# Patient Record
Sex: MALE | URBAN - METROPOLITAN AREA
[De-identification: names, ages, dates, MRNs, and addresses within clinical notes are randomized per-mention and may not be internally consistent; named-entity substitution may affect disease eponyms.]

---

## 2017-04-25 ENCOUNTER — TRANSFERRED RECORDS (OUTPATIENT)
Dept: HEALTH INFORMATION MANAGEMENT | Facility: CLINIC | Age: 57
End: 2017-04-25

## 2017-05-05 ENCOUNTER — TRANSFERRED RECORDS (OUTPATIENT)
Dept: HEALTH INFORMATION MANAGEMENT | Facility: CLINIC | Age: 57
End: 2017-05-05

## 2017-10-27 ENCOUNTER — TRANSFERRED RECORDS (OUTPATIENT)
Dept: HEALTH INFORMATION MANAGEMENT | Facility: CLINIC | Age: 57
End: 2017-10-27

## 2017-11-22 NOTE — TELEPHONE ENCOUNTER
APPT INFO    Date /Time:  12/6/17 10AM    Reason for Appt: W/C L shoulder arthritis / tendonitis    Ref Provider/Clinic:  JEYSON MILLER/ cait    Are there internal records? If yes, list: none   Patient Contact (Y/N) & Call Details: No- referral   Action: Reviewed records     OUTSIDE RECORDS CHECKLIST     CLINIC NAME COMMENTS REC (x) IMG (x)   [facility]              Records Received From: [facility]    Date/Exam/Location  (specify location if different)   Office Notes:  8/24/17, 8/23/16, 5/11/17, 9/26/17, 10/27/17, 10/17/16   Radiology Reports: - xray left shoulder 7/26/16   - xray arthrogram left shoulder 8/8/16  - CT ext upper left 8/8/16  - xray thoracic 4/25/17  - xray cervical 12/22/16  - xray myelogram cervical 5/5/17  - CT cervical 5/5/17  Sarah Cunha Notified (Y/N):    Procedure Notes:  (include injections) - EMG 10/17/16   Missing: - surgeries

## 2017-11-29 NOTE — TELEPHONE ENCOUNTER
Received Imaging From: [facility]     Image Type (x): Disc:_____  Pacs:_x____      Exam Date/Name: All images listed in [place] records Comments: images are in PACS

## 2017-12-04 DIAGNOSIS — G89.29 CHRONIC LEFT SHOULDER PAIN: Primary | ICD-10-CM

## 2017-12-04 DIAGNOSIS — M25.512 CHRONIC LEFT SHOULDER PAIN: Primary | ICD-10-CM

## 2017-12-06 ENCOUNTER — PRE VISIT (OUTPATIENT)
Dept: ORTHOPEDICS | Facility: CLINIC | Age: 57
End: 2017-12-06

## 2017-12-06 NOTE — TELEPHONE ENCOUNTER
Records Received From: [place]     Date/Exam/Location  (specify location if different)   Radiology Reports: - xray left shoulder 7/26/16  - xray arthrogram left shoulder 8/8/16  - CT upper ext 8/8/16  Sarah Cunha Notified (Y/N):    Procedure Notes:  (include injections) - left shoulder injection 9/21/16

## 2017-12-27 ENCOUNTER — OFFICE VISIT (OUTPATIENT)
Dept: ORTHOPEDICS | Facility: CLINIC | Age: 57
End: 2017-12-27
Payer: OTHER MISCELLANEOUS

## 2017-12-27 ENCOUNTER — RADIANT APPOINTMENT (OUTPATIENT)
Dept: GENERAL RADIOLOGY | Facility: CLINIC | Age: 57
End: 2017-12-27
Attending: ORTHOPAEDIC SURGERY
Payer: OTHER MISCELLANEOUS

## 2017-12-27 VITALS — HEIGHT: 74 IN | BODY MASS INDEX: 29.54 KG/M2 | WEIGHT: 230.2 LBS

## 2017-12-27 DIAGNOSIS — G62.9 PERIPHERAL POLYNEUROPATHY: Primary | ICD-10-CM

## 2017-12-27 DIAGNOSIS — M25.512 CHRONIC LEFT SHOULDER PAIN: ICD-10-CM

## 2017-12-27 DIAGNOSIS — G89.29 CHRONIC LEFT SHOULDER PAIN: ICD-10-CM

## 2017-12-27 RX ORDER — SENNA AND DOCUSATE SODIUM 50; 8.6 MG/1; MG/1
TABLET, FILM COATED ORAL
COMMUNITY
Start: 2017-09-19

## 2017-12-27 RX ORDER — ESCITALOPRAM OXALATE 20 MG/1
20 TABLET ORAL
COMMUNITY
Start: 2017-09-05

## 2017-12-27 RX ORDER — PREGABALIN 300 MG/1
CAPSULE ORAL
COMMUNITY
Start: 2017-09-07

## 2017-12-27 RX ORDER — BUPROPION HYDROCHLORIDE 450 MG/1
450 TABLET, FILM COATED, EXTENDED RELEASE ORAL
COMMUNITY
Start: 2017-08-01

## 2017-12-27 RX ORDER — MODAFINIL 100 MG/1
100 TABLET ORAL
COMMUNITY
Start: 2017-09-05 | End: 2018-03-04

## 2017-12-27 RX ORDER — LEVOTHYROXINE SODIUM 125 UG/1
125 TABLET ORAL
COMMUNITY
Start: 2017-04-20

## 2017-12-27 RX ORDER — CHLORHEXIDINE GLUCONATE ORAL RINSE 1.2 MG/ML
15 SOLUTION DENTAL
COMMUNITY

## 2017-12-27 RX ORDER — FLUORIDE TOOTHPASTE
TOOTHPASTE DENTAL
COMMUNITY
Start: 2017-09-19

## 2017-12-27 RX ORDER — TRIAMCINOLONE ACETONIDE 1 MG/G
OINTMENT TOPICAL
COMMUNITY
Start: 2017-06-01

## 2017-12-27 NOTE — NURSING NOTE
"Reason For Visit:   Chief Complaint   Patient presents with     Consult     W/C Left shoulder arthritis/tendonitis.       PCP: No primary care provider on file.  Ref: JEYSON MILLER    ?  No  Occupation none.  Currently working? No.  Work status?  On disability.  Date of injury: 2000 on right, 2011 left  Type of injury: W/C both time felt shoulder give out while working  Date of surgery: 2000 on right   Type of surgery: Many surgeries.  Smoker: No  Request smoking cessation information: No    Right hand dominant    SANE score  Affected shoulder: Left  Right shoulder SANE: 0  Left shoulder SANE: 0    Ht 1.869 m (6' 1.58\")  Wt 104.4 kg (230 lb 3.2 oz)  BMI 29.89 kg/m2      Pain Assessment  Patient Currently in Pain: Yes  0-10 Pain Scale: 9  Primary Pain Location: Shoulder  Pain Orientation: Left  Pain Descriptors: Aching, Burning, Stabbing  Alleviating Factors: Rest  Aggravating Factors: Movement (vibration)    Mirella Rogers ATC        "

## 2017-12-27 NOTE — MR AVS SNAPSHOT
After Visit Summary   2017    Manny Frazier    MRN: [id number]           Patient Information     Date Of Birth          1960        Visit Information        Provider Department      2017 11:00 AM Suki Vieira MD [facility]        Today's Diagnoses     Peripheral polyneuropathy    -  1       Follow-ups after your visit        Follow-up notes from your care team     Return if symptoms worsen or fail to improve.      Who to contact     Please call your clinic at [phone] to:    Ask questions about your health    Make or cancel appointments    Discuss your medicines    Learn about your test results    Speak to your doctor   If you have compliments or concerns about an experience at your clinic, or if you wish to file a complaint, please contact [facility] Patient Relations at [phone] or email us at [email or web address]         Additional Information About Your Visit        MyChart Information     [employer] is an electronic gateway that provides easy, online access to your medical records. With [employer], you can request a clinic appointment, read your test results, renew a prescription or communicate with your care team.     To sign up for [employer] visit the website at [email or web address]   You will be asked to enter the access code listed below, as well as some personal information. Please follow the directions to create your username and password.     Your access code is: [email or web address]  Expires: 2018  6:31 AM     Your access code will  in 90 days. If you need help or a new code, please contact your [facility] or call [phone] for assistance.        Care EveryWhere ID     This is your Care EveryWhere ID. This could be used by other organizations to access your [facility] medical records  [id number]        Your Vitals Were     Height BMI (Body Mass Index)           "      1.869 m (6' 1.58\") 29.89 kg/m2           Blood Pressure from Last 3 Encounters:   No data found for BP    Weight from Last 3 Encounters:   12/27/17 104.4 kg (230 lb 3.2 oz)              Today, you had the following     No orders found for display       Primary Care Provider    None Specified       No primary provider on file.        Equal Access to Services     [facility]: Hadii jose ku hadjimo Sochikaali, waaxda luqadaha, qaybta kaalmada janisda, waxay idiin hayumerallyn martinezbetygriffin tovar . So [facility] [phone].    ATENCIÓN: Si habla español, tiene a ortega disposición servicios gratuitos de asistencia lingüística. Llame al [phone].    We comply with applicable federal civil rights laws and [place] laws. We do not discriminate on the basis of race, color, national origin, age, disability, sex, sexual orientation, or gender identity.            Thank you!     Thank you for choosing [facility]  for your care. Our goal is always to provide you with excellent care. Hearing back from our patients is one way we can continue to improve our services. Please take a few minutes to complete the written survey that you may receive in the mail after your visit with us. Thank you!             Your Updated Medication List - Protect others around you: Learn how to safely use, store and throw away your medicines at www.disposemymeds.org.          This list is accurate as of: 12/27/17 11:59 PM.  Always use your most recent med list.                   Brand Name Dispense Instructions for use Diagnosis    artificial saliva Liqd liquid      every 6 hours as needed.        BuPROPion HCl ER (XL) 450 MG Tb24      Take 450 mg by mouth        chlorhexidine 0.12 % solution    PERIDEX     15 mLs        cholecalciferol 1000 UNIT tablet    vitamin D3     Take 3,000 Units by mouth        cloNIDine 0.1 MG/24HR WK patch    CATAPRES-TTS1     0.1 mg        escitalopram 20 MG tablet    LEXAPRO     Take 20 mg by mouth        " levothyroxine 125 MCG tablet    SYNTHROID/LEVOTHROID     Take 125 mcg by mouth        LYRICA 300 MG Caps capsule   Generic drug:  pregabalin      TAKE 1 CAPSULE BY MOUTH TWICE A DAY        milnacipran 50 MG Tabs tablet    SAVELLA     Take 50 mg by mouth        modafinil 100 MG tablet    PROVIGIL     Take 100 mg by mouth        SENNA-docusate sodium 8.6-50 MG Tabs      TAKE 1 TABLET BY MOUTH TWICE A DAY        triamcinolone 0.1 % ointment    KENALOG     Apply to affected area 2 times a day

## 2017-12-27 NOTE — PROGRESS NOTES
[facility], Orthopaedic Surgery Consultation    Manny Frazier MRN# [id number]   Age: 57 year old YOB: 1960     Reason for consult:  Chronic left shoulder pain       Requesting physician:  Dr. Moulton         Assessment and Plan:   Assessment:  Complex neuropathy of bilateral upper extremities  Chronic pain of bilateral upper extremities  Complex regional pain syndrome, previously diagnosed    Plan:  The patient was reassured that there was no intrinsic shoulder pathology that is contributing to his symptoms.  We will have one of our peripheral nerve specialists review his chart, and he may see one of those colleagues should it appear they could be of assistance to him.            History of Present Illness:   Patient was seen and examined by me. History, PMH, Meds, SH, complete ROS (10 organ systems) and PE reviewed with patient and prior medical records.      Patient reports chronic bilateral neck pain, shoulder pain, arm pain, hand pain, and pinky finger pain, that has been present for the last 17 years on his right side and 5-6 years on his left side.  He states he has seen multiple specialists for this problem, and that he is here after being referred from his primary physician in [facility].  He states agreeing factors are driving, increased activity, and anything that vibrates his extremities.  He states that with these activities, he experiences a burning, aching, stabbing pain that radiates from his pinky finger up his arm into his neck.  He states the symptoms sometimes will travel from the neck down.  He reports increased sensitivity to touch bilaterally, and some clumsiness in both hands.  He states his pain has been constant for the last 6 years and his left shoulder and has not gotten better or worse with any intervention, despite peripheral nerve stimulators, injections, and multiple pain medications.          Past Medical History:   No past medical history on file.  Complex  regional pain syndrome, treated by a pain clinic under contract    Cervical radiculopathy bilateral upper extremities    Depression    Mild left glenohumeral arthritis         Past Surgical History:   No past surgical history on file.  Patient states he had 11 surgeries on his right arm, including multiple carpal tunnel releases and revisions, cubital tunnel release ×3, and other peripheral nerve releases.  He states this did not help with pain.         Social History:     Social History     Social History     Marital status:      Spouse name: N/A     Number of children: N/A     Years of education: N/A     Social History Main Topics     Smoking status: Never Smoker     Smokeless tobacco: Never Used     Alcohol use None     Drug use: None     Sexual activity: Not Asked     Other Topics Concern     None     Social History Narrative     None             Family History:   No family history on file.           Medications:     Current Outpatient Prescriptions   Medication Sig     BuPROPion HCl ER, XL, 450 MG TB24 Take 450 mg by mouth     chlorhexidine (PERIDEX) 0.12 % solution 15 mLs     cholecalciferol (VITAMIN D3) 1000 UNIT tablet Take 3,000 Units by mouth     cloNIDine (CATAPRES-TTS1) 0.1 MG/24HR WK patch 0.1 mg     escitalopram (LEXAPRO) 20 MG tablet Take 20 mg by mouth     levothyroxine (SYNTHROID/LEVOTHROID) 125 MCG tablet Take 125 mcg by mouth     pregabalin (LYRICA) 300 MG CAPS capsule TAKE 1 CAPSULE BY MOUTH TWICE A DAY     milnacipran (SAVELLA) 50 MG TABS tablet Take 50 mg by mouth     modafinil (PROVIGIL) 100 MG tablet Take 100 mg by mouth     artificial saliva (BIOTENE DRY MOUTHWASH) LIQD liquid every 6 hours as needed.     Sennosides-Docusate Sodium (SENNA-DOCUSATE SODIUM) 8.6-50 MG TABS TAKE 1 TABLET BY MOUTH TWICE A DAY     triamcinolone (KENALOG) 0.1 % ointment Apply to affected area 2 times a day     No current facility-administered medications for this visit.              Allergies:     "  Allergies   Allergen Reactions     No Clinical Screening - See Comments Hives and Rash     Cause blisters     Latex Rash            Review of Systems:   A comprehensive 10 point review of systems (constitutional, ENT, cardiac, peripheral vascular, respiratory, GI, , Musculoskeletal, skin, Neurological) was performed and found to be negative except as described in this note.           Physical Exam:   COMPLETE EXAMINATION:   VITAL SIGNS: Ht 1.869 m (6' 1.58\")  Wt 104.4 kg (230 lb 3.2 oz)  BMI 29.89 kg/m2  RESP: Non labored breathing  SKIN: Grossly normal, multiple well-healed scars on the right upper extremity.  MUSCULOSKELETAL: Left shoulder examination reveals full forward flexion of the left shoulder to 160 , full abduction of the left shoulder to 150 , external rotation to 45 , internal rotation to T8-9 on the left and T12-L1 on the right.  Sensation in the axillary, median, radial, and ulnar nerves intact through patient subjectively notes decreased sensation in ulnar distribution. Hand intrinsic strength appears full or near full. No clawing noted on left hand but clawing noted on right hand.    King's negative  Yergason's negative  Neer's and [place] impingement negative    Patient reports increased burning, neurologic pain with physical exam maneuvers, but denies deep or irregular shoulder pain with any physical exam maneuver.    Neuro:  0 beats of clonus bilaterally.  Spurling + on left with reproduction of symptoms to the shoulder, Spurling negative on right.  Lhermitte's negative.            Data:     X-rays of the left shoulder today (12/27/2017) reveal no acute fracture or osseous abnormality.    CT arthrogram of the left shoulder 8/8/16 demonstrates an intact rotator cuff without atrophy. No evidence for contrast dissection beneath the labrum to indicate any labral tear. Long head of biceps intact.       Patient seen and discussed with Dr. Vieira.     Rodrick Ashford MD  Orthopaedic Surgery " PGY-1    I have personally examined this patient and have reviewed the clinical presentation and progress note with the resident.  I agree with the treatment plan as outlined.  The plan was formulated with the resident on the day of the resident's note.

## 2017-12-27 NOTE — LETTER
12/27/2017       RE: Manny Frazier  [address] [place] [id number]     Dear Colleague,    Thank you for referring your patient, Manny Frazier, to the [facility] at [facility]. Please see a copy of my visit note below.    [facility], Orthopaedic Surgery Consultation    Manny Frazier MRN# [id number]   Age: 57 year old YOB: 1960     Reason for consult:  Chronic left shoulder pain       Requesting physician:  Dr. Moulton         Assessment and Plan:   Assessment:  Complex neuropathy of bilateral upper extremities  Chronic pain of bilateral upper extremities  Complex regional pain syndrome, previously diagnosed    Plan:  The patient was reassured that there was no intrinsic shoulder pathology that is contributing to his symptoms.  We will have one of our peripheral nerve specialists review his chart, and he may see one of those colleagues should it appear they could be of assistance to him.            History of Present Illness:   Patient was seen and examined by me. History, PMH, Meds, SH, complete ROS (10 organ systems) and PE reviewed with patient and prior medical records.      Patient reports chronic bilateral neck pain, shoulder pain, arm pain, hand pain, and pinky finger pain, that has been present for the last 17 years on his right side and 5-6 years on his left side.  He states he has seen multiple specialists for this problem, and that he is here after being referred from his primary physician in [facility].  He states agreeing factors are driving, increased activity, and anything that vibrates his extremities.  He states that with these activities, he experiences a burning, aching, stabbing pain that radiates from his pinky finger up his arm into his neck.  He states the symptoms sometimes will travel from the neck down.  He reports increased sensitivity to touch bilaterally, and some clumsiness in  both hands.  He states his pain has been constant for the last 6 years and his left shoulder and has not gotten better or worse with any intervention, despite peripheral nerve stimulators, injections, and multiple pain medications.          Past Medical History:   No past medical history on file.  Complex regional pain syndrome, treated by a pain clinic under contract    Cervical radiculopathy bilateral upper extremities    Depression    Mild left glenohumeral arthritis         Past Surgical History:   No past surgical history on file.  Patient states he had 11 surgeries on his right arm, including multiple carpal tunnel releases and revisions, cubital tunnel release ×3, and other peripheral nerve releases.  He states this did not help with pain.         Social History:     Social History     Social History     Marital status:      Spouse name: N/A     Number of children: N/A     Years of education: N/A     Social History Main Topics     Smoking status: Never Smoker     Smokeless tobacco: Never Used     Alcohol use None     Drug use: None     Sexual activity: Not Asked     Other Topics Concern     None     Social History Narrative     None             Family History:   No family history on file.           Medications:     Current Outpatient Prescriptions   Medication Sig     BuPROPion HCl ER, XL, 450 MG TB24 Take 450 mg by mouth     chlorhexidine (PERIDEX) 0.12 % solution 15 mLs     cholecalciferol (VITAMIN D3) 1000 UNIT tablet Take 3,000 Units by mouth     cloNIDine (CATAPRES-TTS1) 0.1 MG/24HR WK patch 0.1 mg     escitalopram (LEXAPRO) 20 MG tablet Take 20 mg by mouth     levothyroxine (SYNTHROID/LEVOTHROID) 125 MCG tablet Take 125 mcg by mouth     pregabalin (LYRICA) 300 MG CAPS capsule TAKE 1 CAPSULE BY MOUTH TWICE A DAY     milnacipran (SAVELLA) 50 MG TABS tablet Take 50 mg by mouth     modafinil (PROVIGIL) 100 MG tablet Take 100 mg by mouth     artificial saliva (BIOTENE DRY MOUTHWASH) LIQD liquid every  "6 hours as needed.     Sennosides-Docusate Sodium (SENNA-DOCUSATE SODIUM) 8.6-50 MG TABS TAKE 1 TABLET BY MOUTH TWICE A DAY     triamcinolone (KENALOG) 0.1 % ointment Apply to affected area 2 times a day     No current facility-administered medications for this visit.              Allergies:      Allergies   Allergen Reactions     No Clinical Screening - See Comments Hives and Rash     Cause blisters     Latex Rash            Review of Systems:   A comprehensive 10 point review of systems (constitutional, ENT, cardiac, peripheral vascular, respiratory, GI, , Musculoskeletal, skin, Neurological) was performed and found to be negative except as described in this note.           Physical Exam:   COMPLETE EXAMINATION:   VITAL SIGNS: Ht 1.869 m (6' 1.58\")  Wt 104.4 kg (230 lb 3.2 oz)  BMI 29.89 kg/m2  RESP: Non labored breathing  SKIN: Grossly normal, multiple well-healed scars on the right upper extremity.  MUSCULOSKELETAL: Left shoulder examination reveals full forward flexion of the left shoulder to 160  , full abduction of the left shoulder to 150  , external rotation to 45  , internal rotation to T8-9 on the left and T12-L1 on the right.  Sensation in the axillary, median, radial, and ulnar nerves intact through patient subjectively notes decreased sensation in ulnar distribution. Hand intrinsic strength appears full or near full. No clawing noted on left hand but clawing noted on right hand.    [place]'s negative  Yergason's negative  Neer's and [place] impingement negative    Patient reports increased burning, neurologic pain with physical exam maneuvers, but denies deep or irregular shoulder pain with any physical exam maneuver.    Neuro:  0 beats of clonus bilaterally.  Spurling + on left with reproduction of symptoms to the shoulder, Spurling negative on right.  Lhermitte's negative.            Data:     X-rays of the left shoulder today (12/27/2017) reveal no acute fracture or osseous abnormality.    CT " arthrogram of the left shoulder 8/8/16 demonstrates an intact rotator cuff without atrophy. No evidence for contrast dissection beneath the labrum to indicate any labral tear. Long head of biceps intact.       Patient seen and discussed with Dr. Vieira.     Rodrick Ashford MD  Orthopaedic Surgery PGY-1    I have personally examined this patient and have reviewed the clinical presentation and progress note with the resident.  I agree with the treatment plan as outlined.  The plan was formulated with the resident on the day of the resident's note.     Sincerely,    Suki Vieira MD

## 2018-01-08 ENCOUNTER — HOSPITAL ENCOUNTER (EMERGENCY)
Dept: HOSPITAL 43 - DL.ED | Age: 58
Discharge: HOME | End: 2018-01-08
Payer: MEDICARE

## 2018-01-08 DIAGNOSIS — E86.0: ICD-10-CM

## 2018-01-08 DIAGNOSIS — Z79.899: ICD-10-CM

## 2018-01-08 DIAGNOSIS — Z91.040: ICD-10-CM

## 2018-01-08 DIAGNOSIS — Z79.82: ICD-10-CM

## 2018-01-08 DIAGNOSIS — K52.9: Primary | ICD-10-CM

## 2018-01-08 LAB
ANION GAP SERPL CALC-SCNC: 15.9 MMOL/L
CHLORIDE SERPL-SCNC: 101 MMOL/L (ref 101–111)
SODIUM SERPL-SCNC: 133 MMOL/L (ref 135–145)

## 2018-01-08 PROCEDURE — 99282 EMERGENCY DEPT VISIT SF MDM: CPT

## 2018-01-08 PROCEDURE — 85025 COMPLETE CBC W/AUTO DIFF WBC: CPT

## 2018-01-08 PROCEDURE — 96375 TX/PRO/DX INJ NEW DRUG ADDON: CPT

## 2018-01-08 PROCEDURE — 36415 COLL VENOUS BLD VENIPUNCTURE: CPT

## 2018-01-08 PROCEDURE — 96361 HYDRATE IV INFUSION ADD-ON: CPT

## 2018-01-08 PROCEDURE — 99284 EMERGENCY DEPT VISIT MOD MDM: CPT

## 2018-01-08 PROCEDURE — 81001 URINALYSIS AUTO W/SCOPE: CPT

## 2018-01-08 PROCEDURE — 87804 INFLUENZA ASSAY W/OPTIC: CPT

## 2018-01-08 PROCEDURE — 96374 THER/PROPH/DIAG INJ IV PUSH: CPT

## 2018-01-08 PROCEDURE — 80053 COMPREHEN METABOLIC PANEL: CPT

## 2018-01-09 ENCOUNTER — TELEPHONE (OUTPATIENT)
Dept: ORTHOPEDICS | Facility: CLINIC | Age: 58
End: 2018-01-09

## 2018-01-10 NOTE — TELEPHONE ENCOUNTER
Clinic notes were reviewed by 3 hand surgeons in the orthopedic clinic.  All agreed there is no surgical option for the patient's complex neuropathy.  Patient was informed,  He stated he does not want  to schedule a consultation for treatment if surgery would not be performed.